# Patient Record
Sex: MALE | Race: WHITE | URBAN - METROPOLITAN AREA
[De-identification: names, ages, dates, MRNs, and addresses within clinical notes are randomized per-mention and may not be internally consistent; named-entity substitution may affect disease eponyms.]

---

## 2023-01-16 ENCOUNTER — OFFICE VISIT (OUTPATIENT)
Dept: ORTHOPEDIC SURGERY | Age: 58
End: 2023-01-16
Payer: COMMERCIAL

## 2023-01-16 VITALS
WEIGHT: 210 LBS | TEMPERATURE: 97.8 F | HEART RATE: 72 BPM | HEIGHT: 70 IN | BODY MASS INDEX: 30.06 KG/M2 | OXYGEN SATURATION: 98 %

## 2023-01-16 DIAGNOSIS — S46.211A BICEPS RUPTURE, DISTAL, RIGHT, INITIAL ENCOUNTER: Primary | ICD-10-CM

## 2023-01-16 PROCEDURE — 99214 OFFICE O/P EST MOD 30 MIN: CPT | Performed by: PHYSICIAN ASSISTANT

## 2023-01-16 RX ORDER — TIZANIDINE 4 MG/1
4 TABLET ORAL DAILY
COMMUNITY
Start: 2022-12-01

## 2023-01-16 RX ORDER — DOXEPIN HYDROCHLORIDE 10 MG/1
CAPSULE ORAL
COMMUNITY
Start: 2022-12-24

## 2023-01-16 ASSESSMENT — ENCOUNTER SYMPTOMS: RESPIRATORY NEGATIVE: 1

## 2023-01-16 NOTE — PROGRESS NOTES
Black Hearn (:  1965) is a 59 y.o. male,Established patient, here for evaluation of the following chief complaint(s):  New Patient (Tendonytis on right forearm )         ASSESSMENT/PLAN:  1. Biceps rupture, distal, right, initial encounter  -     MRI ELBOW RIGHT WO CONTRAST; Future    No follow-ups on file. Subjective   SUBJECTIVE/OBJECTIVE:  This is a 31-ANNB-MGQ disabled  complaining of right elbow pain. I have seen him previously for partial tear of the distal biceps. He states now he is using a cane with his right arm and he has significant pain in the distal biceps\antecubital area. He states his arm is feeling weaker. He has been working out with weights but is not noticing any improvement in his strength. He denies epicondylar pain. He denies olecranon pain. Denies change in sensation. Review of Systems   Constitutional: Negative. HENT: Negative. Respiratory: Negative. Skin: Negative. Neurological: Negative. Objective   Physical Exam  Musculoskeletal:      Comments: Right elbow-full extension, flexion is symmetrical to 130 degrees. The elbow joint is stable, there is no laxity with radial or ulnar stress. Medial and lateral epicondyles are nontender. Olecranon is nontender. Tenderness with palpation at the distal biceps tendon. Distal biceps pain increases with resisted flexion of the right arm at the elbow. No double pain with resisted pronation or supination of the right wrist.  Sensation is intact distally to light touch. Capillary refill is brisk. Explained to the patient I am concerned about possibility of distal biceps tendon rupture. Patient has noticed increasing pain with progressive weakness as he is now using a cane in his right arm. He denies change in sensation of the hand consistent with neuropathic injury. I believe we should proceed with MRI scan looking at the competency of the distal biceps tendon.   He has had previous MRI scan of the right elbow looking at the distal biceps tendon. This MRI scan was performed at [facility]. I would like to proceed with repeat MRI scan at the clinic for comparison reasons. Patient will contact the office after he has had his MRI scan. An electronic signature was used to authenticate this note.     --ALMAS Max

## 2023-10-17 ENCOUNTER — OFFICE VISIT (OUTPATIENT)
Dept: ORTHOPEDIC SURGERY | Age: 58
End: 2023-10-17
Payer: COMMERCIAL

## 2023-10-17 VITALS
TEMPERATURE: 98.6 F | HEIGHT: 70 IN | HEART RATE: 62 BPM | OXYGEN SATURATION: 99 % | BODY MASS INDEX: 30.35 KG/M2 | WEIGHT: 212 LBS

## 2023-10-17 DIAGNOSIS — S46.812A TRAPEZIUS MUSCLE STRAIN, LEFT, INITIAL ENCOUNTER: Primary | ICD-10-CM

## 2023-10-17 PROCEDURE — 99214 OFFICE O/P EST MOD 30 MIN: CPT | Performed by: PHYSICIAN ASSISTANT

## 2023-10-17 RX ORDER — TRIAMCINOLONE ACETONIDE 40 MG/ML
40 INJECTION, SUSPENSION INTRA-ARTICULAR; INTRAMUSCULAR ONCE
Status: COMPLETED | OUTPATIENT
Start: 2023-10-17 | End: 2023-10-17

## 2023-10-17 RX ORDER — LIDOCAINE HYDROCHLORIDE 10 MG/ML
4 INJECTION, SOLUTION INFILTRATION; PERINEURAL ONCE
Status: COMPLETED | OUTPATIENT
Start: 2023-10-17 | End: 2023-10-17

## 2023-10-17 RX ORDER — OXCARBAZEPINE 150 MG/1
TABLET, FILM COATED ORAL
COMMUNITY
Start: 2023-07-11

## 2023-10-17 RX ORDER — PREDNISONE 20 MG/1
20 TABLET ORAL DAILY
Qty: 7 TABLET | Refills: 0 | Status: SHIPPED | OUTPATIENT
Start: 2023-10-17 | End: 2023-10-24

## 2023-10-17 RX ORDER — PREGABALIN 50 MG/1
CAPSULE ORAL
COMMUNITY
Start: 2023-10-10

## 2023-10-17 RX ORDER — BIOTIN 10000 MCG
CAPSULE ORAL
COMMUNITY
Start: 2023-06-06

## 2023-10-17 RX ORDER — TRAZODONE HYDROCHLORIDE 50 MG/1
50 TABLET ORAL
COMMUNITY
Start: 2023-09-29

## 2023-10-17 RX ADMIN — LIDOCAINE HYDROCHLORIDE 4 ML: 10 INJECTION, SOLUTION INFILTRATION; PERINEURAL at 15:00

## 2023-10-17 RX ADMIN — TRIAMCINOLONE ACETONIDE 40 MG: 40 INJECTION, SUSPENSION INTRA-ARTICULAR; INTRAMUSCULAR at 15:00

## 2023-10-17 ASSESSMENT — ENCOUNTER SYMPTOMS: RESPIRATORY NEGATIVE: 1

## 2023-10-17 NOTE — PROGRESS NOTES
Ozzie Gotti (:  1965) is a 59 y.o. male,Established patient, here for evaluation of the following chief complaint(s):  Shoulder Pain (Pt here for left shoulder pain. )         ASSESSMENT/PLAN:  1. Trapezius muscle strain, left, initial encounter      No follow-ups on file. Subjective   SUBJECTIVE/OBJECTIVE:  Please officer. He is complaining of left-sided neck and upper shoulder pain. He has been doing some standing rows. He has been doing some front dumbbell lifts and is developed left-sided neck pain that radiates into the shoulder. He denies change in sensation of the arm. Review of Systems   Constitutional: Negative. HENT: Negative. Respiratory: Negative. Skin: Negative. Neurological: Negative. Objective   Physical Exam  Musculoskeletal:      Comments: HEENT-tenderness with palpation of the left trapezius attachment along the superior edge of the left scapula. Little palpable knot. No midline spinal tenderness. Pain increases with rotation of head to the right. No skin rash. No hypersensitivity to light touch. Left shoulder-no acromioclavicular, clavicle, SC joint tenderness with palpation. Abduction abduction strength is strong and symmetrical.  Internal rotation is 0 degrees, external rotation 130 degrees. Supraspinatus test is negative for pain or weakness. Liftoff is negative. Biceps contour is normal.  Sensation is intact distally to light touch     Procedure-after sterile prep and under aseptic technique 1 cc 40 mg per mill of Kenalog and 4 cc of 1% lidocaine injected in a fanning distribution into the area of maximal tenderness in the single trapezius muscle. Patient tolerated surgery well. Explained to the patient he does have trapezius muscle strain after lifting weights. I performed trigger point injections today. I like him to use ice today and heat tomorrow.   He may begin some gentle range of motion and stretching

## 2025-05-29 ENCOUNTER — OFFICE VISIT (OUTPATIENT)
Dept: ORTHOPEDIC SURGERY | Age: 60
End: 2025-05-29

## 2025-05-29 VITALS
HEART RATE: 73 BPM | BODY MASS INDEX: 30.35 KG/M2 | WEIGHT: 212 LBS | TEMPERATURE: 97.2 F | OXYGEN SATURATION: 99 % | HEIGHT: 70 IN

## 2025-05-29 DIAGNOSIS — M77.12 LATERAL EPICONDYLITIS, LEFT ELBOW: Primary | ICD-10-CM

## 2025-05-29 RX ORDER — LIDOCAINE HYDROCHLORIDE 10 MG/ML
1 INJECTION, SOLUTION INFILTRATION; PERINEURAL ONCE
Status: COMPLETED | OUTPATIENT
Start: 2025-05-29 | End: 2025-05-29

## 2025-05-29 RX ORDER — TRIAMCINOLONE ACETONIDE 40 MG/ML
40 INJECTION, SUSPENSION INTRA-ARTICULAR; INTRAMUSCULAR ONCE
Status: COMPLETED | OUTPATIENT
Start: 2025-05-29 | End: 2025-05-29

## 2025-05-29 RX ADMIN — LIDOCAINE HYDROCHLORIDE 1 ML: 10 INJECTION, SOLUTION INFILTRATION; PERINEURAL at 15:02

## 2025-05-29 RX ADMIN — TRIAMCINOLONE ACETONIDE 40 MG: 40 INJECTION, SUSPENSION INTRA-ARTICULAR; INTRAMUSCULAR at 15:01

## 2025-05-29 ASSESSMENT — ENCOUNTER SYMPTOMS: RESPIRATORY NEGATIVE: 1

## 2025-05-29 NOTE — PROGRESS NOTES
Juan Reese (:  1965) is a 59 y.o. male,Established patient, here for evaluation of the following chief complaint(s):  Tendonitis (Tendinitis Left Forearm)         Assessment & Plan  Lateral epicondylitis, left elbow   Acute condition, new, inject today with cortisone, home stretches, wrist brace at bedtime    Orders:    INJECT TENDON SHEATH/LIGAMENT    lidocaine 1 % injection 1 mL    triamcinolone acetonide (KENALOG-40) injection 40 mg      Assessment & Plan  1. Elbow pain:    Administer injection for pain relief. Recommend specific exercises: two sets of 10 repetitions on Monday, Wednesday, and Friday. Apply ice massages twice daily for 10 minutes each session. Temporarily discontinue weightlifting until the following Monday to prevent overexertion post-injection.    PROCEDURE  Administered elbow injection today.        No follow-ups on file.       Subjective   History of Present Illness  The patient is a 59-year-old male presenting with elbow pain.    Elbow Pain  He reports persistent discomfort in his elbow, attributed to tendinitis, particularly pronounced during activities like skull crushers, limiting him to 20 pounds. Pain is localized from the elbow to the forearm, exacerbated by lifting and certain movements. Significant discomfort occurs when making a loose fist and pushing against resistance.  - Onset: Persistent discomfort.  - Location: Elbow to forearm.  - Character: Pain attributed to tendinitis.  - Alleviating/Aggravating Factors: Exacerbated by lifting and certain movements; significant discomfort when making a loose fist and pushing against resistance.  - Severity: Limits him to 20 pounds during activities like skull crushers.    Chronic Regional Pain Syndrome  He is enrolled in a three-step program at the [facility] involving neurology, pain management, and physical therapy for chronic regional pain syndrome from a degloving hip injury. His case is extremely rare,